# Patient Record
Sex: FEMALE | Race: WHITE | NOT HISPANIC OR LATINO
[De-identification: names, ages, dates, MRNs, and addresses within clinical notes are randomized per-mention and may not be internally consistent; named-entity substitution may affect disease eponyms.]

---

## 2019-08-13 ENCOUNTER — APPOINTMENT (OUTPATIENT)
Dept: ORTHOPEDIC SURGERY | Facility: CLINIC | Age: 24
End: 2019-08-13
Payer: MEDICAID

## 2019-08-13 VITALS
BODY MASS INDEX: 22.91 KG/M2 | HEART RATE: 49 BPM | DIASTOLIC BLOOD PRESSURE: 73 MMHG | SYSTOLIC BLOOD PRESSURE: 109 MMHG | WEIGHT: 146 LBS | HEIGHT: 67 IN

## 2019-08-13 PROCEDURE — 99204 OFFICE O/P NEW MOD 45 MIN: CPT

## 2019-08-13 NOTE — PHYSICAL EXAM
[de-identified] : Constitutional: Well-nourished, well-developed, No acute distress\par Respiratory:  Good respiratory effort, no SOB\par Lymphatic: No regional lymphadenopathy, no lymphedema\par Psychiatric: Pleasant and normal affect, alert and oriented x3\par Skin: Clean dry and intact B/L UE/LE\par Musculoskeletal: normal except where as noted in regional exam\par \par \par Left Shoulder:\par APPEARANCE: no marked deformities, no swelling or malalignment\par POSITIVE TENDERNESS: none\par NONTENDER: supraspinatus, infraspinatus, teres minor, LH biceps, anterior and posterior capsule, AC joint\par ROM: full & painless, no scapular winging or dyskinesia present\par RESISTIVE TESTING: painless 5/5 resisted flex/ext, empty can/ER/IR, horizontal abd/add \par SPECIAL TESTS: neg Drop Arm, neg Empty Can, neg Savage/Neers, neg Vargas's, neg Speeds, neg Apprehension, neg cross arm adduction, neg apley's scratch test\par Vasc: 2+ radial pulse\par Neuro: AIN, PIN, Ulnar nerve intact to motor, DTRs 2+/4 biceps, triceps, brachioradialis\par Sensation: Intact to light touch throughout\par B/L Elbows:  No asymmetry, malalignment, or swelling, Full ROM, 5/5 strength in flexion/ext, pronation/supination, Joints stable\par B/L Wrist and Hand:  No asymmetry, malalignment, or swelling, Full ROM, 5/5 strength in wrist and long finger flexion/ext, radial/ulnar deviation, Joints stable\par \par Right Shoulder:\par APPEARANCE: no marked deformities, no swelling or malalignment\par POSITIVE TENDERNESS: LH biceps tendon in the bicipital groove\par NONTENDER: supraspinatus, infraspinatus, teres minor. anterior and posterior capsule. AC joint. \par ROM: full & painless, mild scapular dyskinesia, no scapular winging \par RESISTIVE TESTING: Mild pain in anterior shoulder with resisted flexion with hand in supination, painless 5/5 resisted ext, empty can/ER/IR, horizontal abd/add \par SPECIAL TESTS: + Speed's test for pain without subluxation, + yerguson's test for pain in ant shoulder, neg Drop Arm, neg Savage/Neers. neg Vargas's. neg Apprehension. neg cross arm adduction, neg apley's scratch test\par Vasc: 2+ radial pulse\par Neuro: AIN, PIN, Ulnar nerve in tact to motor\par Sensation: Intact to light touch throughout\par \par + Hypermobility: Beighton score 9 out of 9

## 2019-08-13 NOTE — HISTORY OF PRESENT ILLNESS
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 She states she first noticed the pain when she was doing pushups. She had difficulty moving her shoulder. She saw a different orthopedic and had an MRI done, She has been in PT for a little over a month now. She states that several years ago she dislocated her shoulder in her sleep. She has not had any other injuries. Denies N/T/R. She is currently working as a supervisor.

## 2019-08-13 NOTE — DISCUSSION/SUMMARY
[de-identified] : Discussed findings of today's exam and possible causes of patient's pain.  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  Patient already had prior MRI with no abnormal findings. Reviewed possible courses of treatment, and we collaboratively decided best course of treatment at this time will include conservative management. Patient will be started on a course of physical therapy will focus on scapular stabilization and dynamic stabilization of the rotator cuff muscles.   Follow up as needed.  Patient appreciates and agrees with current plan.\par \par This note was generated using dragon medical dictation software.  A reasonable effort has been made for proofreading its contents, but typos may still remain.  If there are any questions or points of clarification needed please notify my office.

## 2020-10-24 ENCOUNTER — EMERGENCY (EMERGENCY)
Facility: HOSPITAL | Age: 25
LOS: 1 days | Discharge: HOME | End: 2020-10-24
Attending: EMERGENCY MEDICINE | Admitting: EMERGENCY MEDICINE
Payer: COMMERCIAL

## 2020-10-24 VITALS
RESPIRATION RATE: 16 BRPM | OXYGEN SATURATION: 100 % | SYSTOLIC BLOOD PRESSURE: 139 MMHG | DIASTOLIC BLOOD PRESSURE: 93 MMHG | HEART RATE: 68 BPM | TEMPERATURE: 98 F

## 2020-10-24 LAB
ALBUMIN SERPL ELPH-MCNC: 4.2 G/DL — SIGNIFICANT CHANGE UP (ref 3.3–5)
ALP SERPL-CCNC: 43 U/L — SIGNIFICANT CHANGE UP (ref 40–120)
ALT FLD-CCNC: 21 U/L — SIGNIFICANT CHANGE UP (ref 4–33)
ANION GAP SERPL CALC-SCNC: 10 MMO/L — SIGNIFICANT CHANGE UP (ref 7–14)
APPEARANCE UR: SIGNIFICANT CHANGE UP
AST SERPL-CCNC: 28 U/L — SIGNIFICANT CHANGE UP (ref 4–32)
BACTERIA # UR AUTO: NEGATIVE — SIGNIFICANT CHANGE UP
BASOPHILS # BLD AUTO: 0.04 K/UL — SIGNIFICANT CHANGE UP (ref 0–0.2)
BASOPHILS NFR BLD AUTO: 0.7 % — SIGNIFICANT CHANGE UP (ref 0–2)
BASOPHILS NFR SPEC: 1.7 % — SIGNIFICANT CHANGE UP (ref 0–2)
BILIRUB SERPL-MCNC: < 0.2 MG/DL — LOW (ref 0.2–1.2)
BILIRUB UR-MCNC: NEGATIVE — SIGNIFICANT CHANGE UP
BLOOD UR QL VISUAL: HIGH
BUN SERPL-MCNC: 10 MG/DL — SIGNIFICANT CHANGE UP (ref 7–23)
CALCIUM SERPL-MCNC: 9.2 MG/DL — SIGNIFICANT CHANGE UP (ref 8.4–10.5)
CHLORIDE SERPL-SCNC: 104 MMOL/L — SIGNIFICANT CHANGE UP (ref 98–107)
CO2 SERPL-SCNC: 24 MMOL/L — SIGNIFICANT CHANGE UP (ref 22–31)
COLOR SPEC: SIGNIFICANT CHANGE UP
CREAT SERPL-MCNC: 0.79 MG/DL — SIGNIFICANT CHANGE UP (ref 0.5–1.3)
EOSINOPHIL # BLD AUTO: 0.12 K/UL — SIGNIFICANT CHANGE UP (ref 0–0.5)
EOSINOPHIL NFR BLD AUTO: 2.1 % — SIGNIFICANT CHANGE UP (ref 0–6)
EOSINOPHIL NFR FLD: 6.1 % — HIGH (ref 0–6)
GIANT PLATELETS BLD QL SMEAR: PRESENT — SIGNIFICANT CHANGE UP
GLUCOSE SERPL-MCNC: 96 MG/DL — SIGNIFICANT CHANGE UP (ref 70–99)
GLUCOSE UR-MCNC: NEGATIVE — SIGNIFICANT CHANGE UP
HCT VFR BLD CALC: 28.9 % — LOW (ref 34.5–45)
HGB BLD-MCNC: 9 G/DL — LOW (ref 11.5–15.5)
HYALINE CASTS # UR AUTO: NEGATIVE — SIGNIFICANT CHANGE UP
IMM GRANULOCYTES NFR BLD AUTO: 2.1 % — HIGH (ref 0–1.5)
KETONES UR-MCNC: NEGATIVE — SIGNIFICANT CHANGE UP
LEUKOCYTE ESTERASE UR-ACNC: SIGNIFICANT CHANGE UP
LYMPHOCYTES # BLD AUTO: 1.79 K/UL — SIGNIFICANT CHANGE UP (ref 1–3.3)
LYMPHOCYTES # BLD AUTO: 30.7 % — SIGNIFICANT CHANGE UP (ref 13–44)
LYMPHOCYTES NFR SPEC AUTO: 17.4 % — SIGNIFICANT CHANGE UP (ref 13–44)
MCHC RBC-ENTMCNC: 28.8 PG — SIGNIFICANT CHANGE UP (ref 27–34)
MCHC RBC-ENTMCNC: 31.1 % — LOW (ref 32–36)
MCV RBC AUTO: 92.3 FL — SIGNIFICANT CHANGE UP (ref 80–100)
MONOCYTES # BLD AUTO: 0.38 K/UL — SIGNIFICANT CHANGE UP (ref 0–0.9)
MONOCYTES NFR BLD AUTO: 6.5 % — SIGNIFICANT CHANGE UP (ref 2–14)
MONOCYTES NFR BLD: 12.2 % — HIGH (ref 2–9)
NEUTROPHIL AB SER-ACNC: 60 % — SIGNIFICANT CHANGE UP (ref 43–77)
NEUTROPHILS # BLD AUTO: 3.39 K/UL — SIGNIFICANT CHANGE UP (ref 1.8–7.4)
NEUTROPHILS NFR BLD AUTO: 57.9 % — SIGNIFICANT CHANGE UP (ref 43–77)
NITRITE UR-MCNC: NEGATIVE — SIGNIFICANT CHANGE UP
NRBC # FLD: 0 K/UL — SIGNIFICANT CHANGE UP (ref 0–0)
PH UR: 6.5 — SIGNIFICANT CHANGE UP (ref 5–8)
PLATELET # BLD AUTO: 298 K/UL — SIGNIFICANT CHANGE UP (ref 150–400)
PLATELET COUNT - ESTIMATE: NORMAL — SIGNIFICANT CHANGE UP
PMV BLD: 10.2 FL — SIGNIFICANT CHANGE UP (ref 7–13)
POTASSIUM SERPL-MCNC: 4.1 MMOL/L — SIGNIFICANT CHANGE UP (ref 3.5–5.3)
POTASSIUM SERPL-SCNC: 4.1 MMOL/L — SIGNIFICANT CHANGE UP (ref 3.5–5.3)
PROT SERPL-MCNC: 7 G/DL — SIGNIFICANT CHANGE UP (ref 6–8.3)
PROT UR-MCNC: NEGATIVE — SIGNIFICANT CHANGE UP
RBC # BLD: 3.13 M/UL — LOW (ref 3.8–5.2)
RBC # FLD: 12.8 % — SIGNIFICANT CHANGE UP (ref 10.3–14.5)
RBC CASTS # UR COMP ASSIST: SIGNIFICANT CHANGE UP (ref 0–?)
SODIUM SERPL-SCNC: 138 MMOL/L — SIGNIFICANT CHANGE UP (ref 135–145)
SP GR SPEC: 1.01 — SIGNIFICANT CHANGE UP (ref 1–1.04)
SQUAMOUS # UR AUTO: SIGNIFICANT CHANGE UP
UROBILINOGEN FLD QL: NORMAL — SIGNIFICANT CHANGE UP
VARIANT LYMPHS # BLD: 2.6 % — SIGNIFICANT CHANGE UP
WBC # BLD: 5.84 K/UL — SIGNIFICANT CHANGE UP (ref 3.8–10.5)
WBC # FLD AUTO: 5.84 K/UL — SIGNIFICANT CHANGE UP (ref 3.8–10.5)
WBC UR QL: HIGH (ref 0–?)

## 2020-10-24 PROCEDURE — 99284 EMERGENCY DEPT VISIT MOD MDM: CPT

## 2020-10-24 PROCEDURE — 73130 X-RAY EXAM OF HAND: CPT | Mod: 26,LT

## 2020-10-24 NOTE — ED PROVIDER NOTE - PATIENT PORTAL LINK FT
You can access the FollowMyHealth Patient Portal offered by [facility] by registering at the following website: [email or web address]. By joining [employer]’s FollowMyHealth portal, you will also be able to view your health information using other applications (apps) compatible with our system.

## 2020-10-24 NOTE — ED ADULT TRIAGE NOTE - CHIEF COMPLAINT QUOTE
pt was seen at [facility] last week , pt had heavy bleeding after sex and passed out hitting head and injured hand/  pt c/o today of headache and left hand  having some numbness and pain/

## 2020-10-24 NOTE — ED ADULT NURSE NOTE - CHIEF COMPLAINT QUOTE
pt was seen at [facility] last week , pt had heavy bleeding after sex and passed out hitting head and injured hand/  pt c/o today of headache and left hand  having some numbness and pain/

## 2020-10-24 NOTE — ED PROVIDER NOTE - NSFOLLOWUPINSTRUCTIONS_ED_ALL_ED_FT
A concussion is a brain injury from a direct hit (blow) to your head or body. This blow causes your brain to shake quickly back and forth inside your skull. This can damage brain cells and cause chemical changes in your brain. Concussions are usually not life-threatening but can cause several serious symptoms.  Post-concussion syndrome is when symptoms that occur after a concussion last longer than normal. These symptoms can last from weeks to months.    Treatment for this condition may depend on your symptoms. Symptoms usually go away on their own over time. Treatments may include: Medicines for headaches. Follow up with primary care for further evaluation    Your test results from your ED visit were discussed with you prior to discharge  You were provided with a copy of your test results

## 2020-10-24 NOTE — ED ADULT NURSE NOTE - OBJECTIVE STATEMENT
headache and numbness on lt hand. denies pain at present. siad her K was low when she was treated on [facility] [place] last Sun. labs swnt and IV placed. ECG ordered

## 2020-10-24 NOTE — ED ADULT NURSE NOTE - NSIMPLEMENTINTERV_GEN_ALL_ED
Implemented All Fall Risk Interventions:  [place] to call system. Call bell, personal items and telephone within reach. Instruct patient to call for assistance. Room bathroom lighting operational. Non-slip footwear when patient is off stretcher. Physically safe environment: no spills, clutter or unnecessary equipment. Stretcher in lowest position, wheels locked, appropriate side rails in place. Provide visual cue, wrist band, yellow gown, etc. Monitor gait and stability. Monitor for mental status changes and reorient to person, place, and time. Review medications for side effects contributing to fall risk. Reinforce activity limits and safety measures with patient and family.

## 2020-10-24 NOTE — ED PROVIDER NOTE - NS ED ROS FT
Constitutional: (-) fever   Head: Normal cephalic, Atraumatic  Eyes/ENT: (-) vision changes  Cardiovascular: (-) chest pain, (-) wheezing  Respiratory: (-) cough, (-) shortness of breath  Gastrointestinal: (-) vomiting, (-) diarrhea, (-) abdominal pain  : (-) dysuria   Musculoskeletal: (-) back pain (+) neck pain  Integumentary: (-) rash, (-) edema  Neurological: (-)loc (+) headache  Allergic/Immunologic: (-) pruritus

## 2020-10-24 NOTE — ED PROVIDER NOTE - OBJECTIVE STATEMENT
24 Y/O F w/ no PMH presents for headache and LT hand pain. Last week experienced profuse vaginal bleeding and headache during intercourse. After showering sat on toilet and fainted. Admits to hitting Lt temple on bathroom counter. Contacted friend for assistance to emergency room. On her way to car outside her home she fainted again. Evaluated at [facility]. Noted to have low potassium and given multiple bags of fluid as per patient. Since that time has experienced bitemporal and posterior headache radiating to neck. Admits to dizziness but not room spinning. Feels off balance. No relief with Tylenol but this morning took 2 Advil with relief. Experiencing numbness/tingling to LT ring finger and pain with ROM. Denies visual change, nausea/vomiting, chest pain, palpitations or SOB. Currently on menstrual period.

## 2020-10-24 NOTE — ED PROVIDER NOTE - CARE PLAN
Principal Discharge DX:	Headache   Principal Discharge DX:	Headache  Secondary Diagnosis:	Hand pain, left

## 2020-10-24 NOTE — ED PROVIDER NOTE - PHYSICAL EXAMINATION
Vital signs reviewed.   CONSTITUTIONAL: Well-appearing; well-nourished; in no apparent distress. Non-toxic appearing.   HEAD: Normocephalic, atraumatic.  EYES: PERRL, EOM intact, conjunctiva and no sclera injection noted  ENT: normal nose; no rhinorrhea  NECK: FAROM against gravity and with resistance. No C-spine tenderness  CARD: Normal S1, S2  RESP: Normal chest excursion with respiration; breath sounds clear and equal bilaterally  ABD/GI: soft, non-distended; non-tender; no palpable organomegaly, no pulsatile mass.  EXT/MS: LT hand no scaphoid tenderness, normal  strength. Negative Tinel and Phalen. Moves all extremities; distal pulses are normal, no pedal edema.  SKIN: Normal for age and race; warm; dry; good turgor; no apparent lesions or exudate noted.  NEURO: Awake, alert, oriented x 3, no gross deficits, CN II-XII grossly intact, no motor or sensory deficit noted.  PSYCH: Normal mood; appropriate affect.

## 2020-10-24 NOTE — ED PROVIDER NOTE - ATTENDING CONTRIBUTION TO CARE
DR. COLLINS, ATTENDING MD-  I performed a face to face bedside interview with the patient regarding history of present illness, review of symptoms and past medical history. I completed an independent physical exam.  I have discussed the patient's plan of care with the PA.   Documentation as above in the note.    26 y/o female with ha, left hand pain.  She was having sexual intercourse last week, developed vag bld.  She went to shower and then sat on toilet.  Had syncopal episode at that time striking head on sink.  Went to osh, told her K was low so it was repleted.  Today with continued mild ha, mental fogginess, worsening left hand pain.  Denies f/c, neck pain, cp, sob, cough, abd pain, n/v/d, dysuria, rash.  Afebrile, vs wnl, nad, ncat, non ttp spinous midline, full rom neck, left hand mild ttp over base of 4th mcp, ctabil, s1s2 rrr no m/r/g, abd soft non ttp no r/g, no cva tenderness b/l, no leg swelling b/l, no rash.  Likely concussed, evaluate for lyte abn, pregnancy, hand fx.  Obtain cbc cmp ucg ekg xr hand reassess, antic dc with neuro f/u as o/p.

## 2020-10-24 NOTE — ED PROVIDER NOTE - CLINICAL SUMMARY MEDICAL DECISION MAKING FREE TEXT BOX
24 Y/O F w/ no PMH presents for headache and LT hand pain. XR to r/o hand fracture. CMP r/o electrolyte abnormality. Pain management, re-assessment and Neuro follow up

## 2020-11-29 ENCOUNTER — EMERGENCY (EMERGENCY)
Facility: HOSPITAL | Age: 25
LOS: 1 days | Discharge: HOME | End: 2020-11-29
Admitting: STUDENT IN AN ORGANIZED HEALTH CARE EDUCATION/TRAINING PROGRAM
Payer: COMMERCIAL

## 2020-11-29 VITALS
DIASTOLIC BLOOD PRESSURE: 95 MMHG | TEMPERATURE: 97 F | OXYGEN SATURATION: 100 % | RESPIRATION RATE: 16 BRPM | SYSTOLIC BLOOD PRESSURE: 134 MMHG | HEART RATE: 76 BPM

## 2020-11-29 PROBLEM — M75.22 BICEPS TENDINITIS OF LEFT UPPER EXTREMITY: Status: RESOLVED | Noted: 2019-08-13 | Resolved: 2020-11-29

## 2020-11-29 PROBLEM — Z78.9 OTHER SPECIFIED HEALTH STATUS: Chronic | Status: ACTIVE | Noted: 2020-10-24

## 2020-11-29 PROBLEM — G25.89 SCAPULAR DYSKINESIS: Status: RESOLVED | Noted: 2019-08-13 | Resolved: 2020-11-29

## 2020-11-29 LAB
B PERT DNA SPEC QL NAA+PROBE: SIGNIFICANT CHANGE UP
C PNEUM DNA SPEC QL NAA+PROBE: SIGNIFICANT CHANGE UP
FLUAV H1 2009 PAND RNA SPEC QL NAA+PROBE: SIGNIFICANT CHANGE UP
FLUAV H1 RNA SPEC QL NAA+PROBE: SIGNIFICANT CHANGE UP
FLUAV H3 RNA SPEC QL NAA+PROBE: SIGNIFICANT CHANGE UP
FLUAV SUBTYP SPEC NAA+PROBE: SIGNIFICANT CHANGE UP
FLUBV RNA SPEC QL NAA+PROBE: SIGNIFICANT CHANGE UP
HADV DNA SPEC QL NAA+PROBE: SIGNIFICANT CHANGE UP
HCOV PNL SPEC NAA+PROBE: SIGNIFICANT CHANGE UP
HMPV RNA SPEC QL NAA+PROBE: SIGNIFICANT CHANGE UP
HPIV1 RNA SPEC QL NAA+PROBE: SIGNIFICANT CHANGE UP
HPIV2 RNA SPEC QL NAA+PROBE: SIGNIFICANT CHANGE UP
HPIV3 RNA SPEC QL NAA+PROBE: SIGNIFICANT CHANGE UP
HPIV4 RNA SPEC QL NAA+PROBE: SIGNIFICANT CHANGE UP
RAPID RVP RESULT: SIGNIFICANT CHANGE UP
RSV RNA SPEC QL NAA+PROBE: SIGNIFICANT CHANGE UP
RV+EV RNA SPEC QL NAA+PROBE: SIGNIFICANT CHANGE UP
SARS-COV-2 RNA SPEC QL NAA+PROBE: SIGNIFICANT CHANGE UP

## 2020-11-29 PROCEDURE — 99283 EMERGENCY DEPT VISIT LOW MDM: CPT

## 2020-11-29 RX ORDER — IBUPROFEN 200 MG
600 TABLET ORAL ONCE
Refills: 0 | Status: COMPLETED | OUTPATIENT
Start: 2020-11-29 | End: 2020-11-29

## 2020-11-29 RX ADMIN — Medication 600 MILLIGRAM(S): at 12:53

## 2020-11-29 NOTE — ED PROVIDER NOTE - PATIENT PORTAL LINK FT
You can access the FollowMyHealth Patient Portal offered by [facility] by registering at the following website: [email or web address]. By joining [employer]’s FollowMyHealth portal, you will also be able to view your health information using other applications (apps) compatible with our system.

## 2020-11-29 NOTE — ED ADULT TRIAGE NOTE - CHIEF COMPLAINT QUOTE
pt states finding lump behind R ear 2 days ago, discomfort present when moving head. pt denies fever. no PMH

## 2020-11-29 NOTE — ED PROVIDER NOTE - CLINICAL SUMMARY MEDICAL DECISION MAKING FREE TEXT BOX
26 yo F with no sig PMHX, no social history, here with complaint of painful, small lump below and behind right ear x 2 days, was initially not painful but now it is. reports sore throat, and mild ear pain.   clinical exam likely consistent with viral pharyngitis with tonsillar lymphadenopathy.  will swab for strep however until.   will swab for rvp, covid.  advise motrin and return precautions.

## 2020-11-29 NOTE — ED PROVIDER NOTE - OBJECTIVE STATEMENT
24 yo F with no sig PMHX, no social history, here with complaint of painful, small lump below and behind right ear x 2 days, was initially not painful but now it is. reports sore throat, and mild ear pain. Denies headache, posterior or anterior neck pain, fever, nausea, vomiting, cp ,sob, cough, difficultly breathing or swallowing.

## 2020-11-29 NOTE — ED PROVIDER NOTE - NSFOLLOWUPINSTRUCTIONS_ED_ALL_ED_FT
Please Quarantine until results of RVP/ COVID-19 are back and negative.   If positive, please quarantine for 14 days.     Follow up with your Primary Medical Doctor within 2-3days.      Follow with ENT within the week. If you have issues obtaining follow up, please call: [phone] to obtain a doctor or specialist who takes your insurance in your area.     If results or reports were given to you, show copies of your reports given to you. Take all of your medications as previously prescribed.    Take Motrin 600mg every 8hrs with food for pain, swelling.     If you develop fever, difficulty breathing swallowing, worsening swelling, redness.

## 2020-12-01 ENCOUNTER — APPOINTMENT (OUTPATIENT)
Dept: OBGYN | Facility: CLINIC | Age: 25
End: 2020-12-01
Payer: COMMERCIAL

## 2020-12-01 VITALS
SYSTOLIC BLOOD PRESSURE: 120 MMHG | OXYGEN SATURATION: 99 % | DIASTOLIC BLOOD PRESSURE: 80 MMHG | WEIGHT: 146 LBS | HEIGHT: 67 IN | TEMPERATURE: 97 F | BODY MASS INDEX: 22.91 KG/M2 | HEART RATE: 98 BPM

## 2020-12-01 DIAGNOSIS — G25.89 OTHER SPECIFIED EXTRAPYRAMIDAL AND MOVEMENT DISORDERS: ICD-10-CM

## 2020-12-01 DIAGNOSIS — Z78.9 OTHER SPECIFIED HEALTH STATUS: ICD-10-CM

## 2020-12-01 DIAGNOSIS — M75.22 BICIPITAL TENDINITIS, LEFT SHOULDER: ICD-10-CM

## 2020-12-01 LAB
CULTURE RESULTS: SIGNIFICANT CHANGE UP
SPECIMEN SOURCE: SIGNIFICANT CHANGE UP

## 2020-12-01 PROCEDURE — 99072 ADDL SUPL MATRL&STAF TM PHE: CPT

## 2020-12-01 PROCEDURE — 99385 PREV VISIT NEW AGE 18-39: CPT

## 2020-12-01 NOTE — COUNSELING
[Nutrition/ Exercise/ Weight Management] : nutrition, exercise, weight management [Vitamins/Supplements] : vitamins/supplements [Breast Self Exam] : breast self exam [Contraception/ Emergency Contraception/ Safe Sexual Practices] : contraception, emergency contraception, safe sexual practices [Drugs/Alcohol] : drugs, alcohol [Sexual Abuse] : sexual abuse [STD (testing, results, tx)] : STD (testing, results, tx)

## 2020-12-01 NOTE — HISTORY OF PRESENT ILLNESS
[TextBox_4] : 2 mos ago PCB --drunk,rough sex and larger--1st time with this partner --not coerced.Bleeding signif went to [facility].Inconsistent use condoms ,not interested in OCP/IUD/aor any hormonal contr [Yes] : Patient has concerns regarding sex [Men] : men [Condoms] : Condoms

## 2020-12-02 ENCOUNTER — APPOINTMENT (OUTPATIENT)
Dept: OBGYN | Facility: CLINIC | Age: 25
End: 2020-12-02

## 2020-12-02 LAB
C TRACH RRNA SPEC QL NAA+PROBE: NOT DETECTED
N GONORRHOEA RRNA SPEC QL NAA+PROBE: NOT DETECTED
SOURCE TP AMPLIFICATION: NORMAL

## 2020-12-03 LAB — CYTOLOGY CVX/VAG DOC THIN PREP: ABNORMAL

## 2020-12-07 ENCOUNTER — TRANSCRIPTION ENCOUNTER (OUTPATIENT)
Age: 25
End: 2020-12-07

## 2020-12-08 ENCOUNTER — NON-APPOINTMENT (OUTPATIENT)
Age: 25
End: 2020-12-08

## 2020-12-08 DIAGNOSIS — B96.89 ACUTE VAGINITIS: ICD-10-CM

## 2020-12-08 DIAGNOSIS — N76.0 ACUTE VAGINITIS: ICD-10-CM

## 2020-12-09 ENCOUNTER — LABORATORY RESULT (OUTPATIENT)
Age: 25
End: 2020-12-09

## 2020-12-09 ENCOUNTER — APPOINTMENT (OUTPATIENT)
Dept: FAMILY MEDICINE | Facility: CLINIC | Age: 25
End: 2020-12-09
Payer: COMMERCIAL

## 2020-12-09 VITALS
HEIGHT: 67 IN | SYSTOLIC BLOOD PRESSURE: 110 MMHG | TEMPERATURE: 97.6 F | HEART RATE: 72 BPM | RESPIRATION RATE: 14 BRPM | BODY MASS INDEX: 23.54 KG/M2 | OXYGEN SATURATION: 99 % | WEIGHT: 150 LBS | DIASTOLIC BLOOD PRESSURE: 68 MMHG

## 2020-12-09 DIAGNOSIS — Z80.49 FAMILY HISTORY OF MALIGNANT NEOPLASM OF OTHER GENITAL ORGANS: ICD-10-CM

## 2020-12-09 DIAGNOSIS — Z23 ENCOUNTER FOR IMMUNIZATION: ICD-10-CM

## 2020-12-09 PROCEDURE — 99204 OFFICE O/P NEW MOD 45 MIN: CPT | Mod: 25

## 2020-12-09 PROCEDURE — 36415 COLL VENOUS BLD VENIPUNCTURE: CPT

## 2020-12-09 PROCEDURE — 99072 ADDL SUPL MATRL&STAF TM PHE: CPT

## 2020-12-13 LAB
25(OH)D3 SERPL-MCNC: 32.3 NG/ML
ALBUMIN SERPL ELPH-MCNC: 4.4 G/DL
ALP BLD-CCNC: 95 U/L
ALT SERPL-CCNC: 81 U/L
ANION GAP SERPL CALC-SCNC: 11 MMOL/L
APPEARANCE: CLEAR
AST SERPL-CCNC: 60 U/L
B BURGDOR IGG+IGM SER QL IB: NORMAL
BASOPHILS # BLD AUTO: 0.05 K/UL
BASOPHILS NFR BLD AUTO: 0.7 %
BILIRUB SERPL-MCNC: 0.2 MG/DL
BILIRUBIN URINE: NEGATIVE
BLOOD URINE: NEGATIVE
BUN SERPL-MCNC: 10 MG/DL
CALCIUM SERPL-MCNC: 9.6 MG/DL
CHLORIDE SERPL-SCNC: 101 MMOL/L
CHOLEST SERPL-MCNC: 154 MG/DL
CO2 SERPL-SCNC: 24 MMOL/L
COLOR: NORMAL
CREAT SERPL-MCNC: 0.84 MG/DL
EOSINOPHIL # BLD AUTO: 0.05 K/UL
EOSINOPHIL NFR BLD AUTO: 0.7 %
FOLATE SERPL-MCNC: 11.2 NG/ML
GLUCOSE QUALITATIVE U: NEGATIVE
GLUCOSE SERPL-MCNC: 97 MG/DL
HAV IGM SER QL: NONREACTIVE
HBV CORE IGM SER QL: NONREACTIVE
HBV SURFACE AG SER QL: NONREACTIVE
HCT VFR BLD CALC: 36.2 %
HCV AB SER QL: NONREACTIVE
HCV S/CO RATIO: 0.13 S/CO
HDLC SERPL-MCNC: 53 MG/DL
HGB BLD-MCNC: 11.3 G/DL
HIV1+2 AB SPEC QL IA.RAPID: NONREACTIVE
IMM GRANULOCYTES NFR BLD AUTO: 0.1 %
KETONES URINE: NEGATIVE
LDH SERPL-CCNC: 251 U/L
LDLC SERPL CALC-MCNC: 79 MG/DL
LEUKOCYTE ESTERASE URINE: NEGATIVE
LYMPHOCYTES # BLD AUTO: 4.09 K/UL
LYMPHOCYTES NFR BLD AUTO: 54.5 %
M TB IFN-G BLD-IMP: NEGATIVE
MAN DIFF?: NORMAL
MCHC RBC-ENTMCNC: 26.5 PG
MCHC RBC-ENTMCNC: 31.2 GM/DL
MCV RBC AUTO: 84.8 FL
MONOCYTES # BLD AUTO: 0.47 K/UL
MONOCYTES NFR BLD AUTO: 6.3 %
NEUTROPHILS # BLD AUTO: 2.84 K/UL
NEUTROPHILS NFR BLD AUTO: 37.7 %
NITRITE URINE: NEGATIVE
NONHDLC SERPL-MCNC: 101 MG/DL
PH URINE: 7
PLATELET # BLD AUTO: 372 K/UL
POTASSIUM SERPL-SCNC: 4.4 MMOL/L
PROT SERPL-MCNC: 7.7 G/DL
PROTEIN URINE: NEGATIVE
QUANTIFERON TB PLUS MITOGEN MINUS NIL: 9.97 IU/ML
QUANTIFERON TB PLUS NIL: 0.04 IU/ML
QUANTIFERON TB PLUS TB1 MINUS NIL: 0 IU/ML
QUANTIFERON TB PLUS TB2 MINUS NIL: 0 IU/ML
RBC # BLD: 4.27 M/UL
RBC # FLD: 13.2 %
SODIUM SERPL-SCNC: 136 MMOL/L
SPECIFIC GRAVITY URINE: 1.01
TRIGL SERPL-MCNC: 109 MG/DL
TSH SERPL-ACNC: 0.97 UIU/ML
URATE SERPL-MCNC: 3.6 MG/DL
UROBILINOGEN URINE: NORMAL
VIT B12 SERPL-MCNC: 633 PG/ML
WBC # FLD AUTO: 7.51 K/UL

## 2020-12-13 NOTE — HISTORY OF PRESENT ILLNESS
[FreeTextEntry8] : cc; new ,tired , enlarged cervical nodes \par Patient came to establish herself as a new pt, she c/o feeling tired, no fever,no chills, no weight loss., + night sweats - few months, Also she notices enlarge glands under her right jaw - now getting smaller,No SOB,no cough,no abd pain \par \par

## 2020-12-13 NOTE — REVIEW OF SYSTEMS
[Fever] : no fever [Chills] : no chills [Fatigue] : fatigue [Night Sweats] : night sweats [Swollen Glands] : swollen glands [Negative] : Psychiatric [de-identified] : right cervical

## 2020-12-13 NOTE — HEALTH RISK ASSESSMENT
[Yes] : Yes [2 - 4 times a month (2 pts)] : 2-4 times a month (2 points) [1 or 2 (0 pts)] : 1 or 2 (0 points) [Never (0 pts)] : Never (0 points) [] : No [No falls in past year] : Patient reported no falls in the past year [0] : 2) Feeling down, depressed, or hopeless: Not at all (0) [Audit-CScore] : 1 [de-identified] : walking [de-identified] : regular  [[id number]] : 0

## 2020-12-13 NOTE — PHYSICAL EXAM
[No JVD] : no jugular venous distention [Supple] : supple [No Varicosities] : no varicosities [No Edema] : there was no peripheral edema [Normal] : no rash [de-identified] : + right submandibular lymph adenopathy , 1 cm,non tender,soft

## 2020-12-23 ENCOUNTER — APPOINTMENT (OUTPATIENT)
Dept: FAMILY MEDICINE | Facility: CLINIC | Age: 25
End: 2020-12-23
Payer: COMMERCIAL

## 2020-12-23 VITALS
DIASTOLIC BLOOD PRESSURE: 76 MMHG | SYSTOLIC BLOOD PRESSURE: 110 MMHG | BODY MASS INDEX: 23.86 KG/M2 | HEIGHT: 67 IN | WEIGHT: 152 LBS | OXYGEN SATURATION: 99 % | RESPIRATION RATE: 14 BRPM | HEART RATE: 64 BPM | TEMPERATURE: 97.6 F

## 2020-12-23 PROCEDURE — 99395 PREV VISIT EST AGE 18-39: CPT | Mod: 25

## 2020-12-23 PROCEDURE — 36415 COLL VENOUS BLD VENIPUNCTURE: CPT

## 2020-12-23 PROCEDURE — 99072 ADDL SUPL MATRL&STAF TM PHE: CPT

## 2020-12-23 RX ORDER — METRONIDAZOLE 7.5 MG/G
0.75 GEL VAGINAL
Qty: 1 | Refills: 3 | Status: COMPLETED | COMMUNITY
Start: 2020-12-08 | End: 2020-12-23

## 2020-12-23 NOTE — HISTORY OF PRESENT ILLNESS
[FreeTextEntry1] : cc: physical  [de-identified] : Patient came  for physical. Denies CP,SOB,Abd pain, no N,V,C,D, pt still c/o night sweats, fatigue  no fever, no chills..Patient last blood work shows Anemia,  mildly  elevated LDH , she got US  of the  neck done today \par

## 2020-12-23 NOTE — HEALTH RISK ASSESSMENT
[Good] : ~his/her~  mood as  good [Yes] : Yes [2 - 3 times a week (3 pts)] : 2 - 3  times a week (3 points) [1 or 2 (0 pts)] : 1 or 2 (0 points) [Never (0 pts)] : Never (0 points) [No] : In the past 12 months have you used drugs other than those required for medical reasons? No [No falls in past year] : Patient reported no falls in the past year [0] : 2) Feeling down, depressed, or hopeless: Not at all (0) [Patient reported PAP Smear was normal] : Patient reported PAP Smear was normal [None] : None [With Family] : lives with family [Unemployed] : unemployed [College] : College [Single] : single [Feels Safe at Home] : Feels safe at home [Smoke Detector] : smoke detector [Carbon Monoxide Detector] : carbon monoxide detector [Seat Belt] :  uses seat belt [Sunscreen] : uses sunscreen [With Patient/Caregiver] : With Patient/Caregiver [Aggressive treatment] : aggressive treatment [FreeTextEntry1] : night sweats, swollen neck  [] : No [de-identified] : No [de-identified] : No [Audit-CScore] : 3 [de-identified] : none [de-identified] : regular  [AOE8Drole] : 0 [Change in mental status noted] : No change in mental status noted [Language] : denies difficulty with language [Reports changes in hearing] : Reports no changes in hearing [Reports changes in vision] : Reports no changes in vision [Reports changes in dental health] : Reports no changes in dental health [MammogramDate] : NA [PapSmearDate] : 12/20 [BoneDensityDate] : NA [ColonoscopyDate] : NA [HIVDate] : 11/20 [HepatitisCDate] : 11/20 [AdvancecareDate] : 12/20

## 2020-12-23 NOTE — PHYSICAL EXAM
[No Varicosities] : no varicosities [No Edema] : there was no peripheral edema [Declined Breast Exam] : declined breast exam  [Normal Supraclavicular Nodes] : no supraclavicular lymphadenopathy [Normal Axillary Nodes] : no axillary lymphadenopathy [Alert and Oriented x3] : oriented to person, place, and time [Normal] : affect was normal and insight and judgment were intact [de-identified] : done by GYN  [de-identified] : + enlarged right submandibular lymph nodes

## 2020-12-23 NOTE — REVIEW OF SYSTEMS
[Night Sweats] : night sweats [Negative] : Psychiatric [Fever] : no fever [Chills] : no chills [Fatigue] : no fatigue [Hot Flashes] : no hot flashes [Earache] : no earache [Hearing Loss] : no hearing loss [Nosebleed] : no nosebleeds [Nasal Discharge] : no nasal discharge [Sore Throat] : no sore throat [Postnasal Drip] : no postnasal drip [Easy Bleeding] : no easy bleeding [Easy Bruising] : no easy bruising [Swollen Glands] : swollen glands

## 2020-12-30 LAB
ALBUMIN SERPL ELPH-MCNC: 4.4 G/DL
ALP BLD-CCNC: 63 U/L
ALT SERPL-CCNC: 16 U/L
ANA SER IF-ACNC: NEGATIVE
ANION GAP SERPL CALC-SCNC: 10 MMOL/L
AST SERPL-CCNC: 20 U/L
BILIRUB SERPL-MCNC: 0.3 MG/DL
BUN SERPL-MCNC: 9 MG/DL
CALCIUM SERPL-MCNC: 9.5 MG/DL
CHLORIDE SERPL-SCNC: 102 MMOL/L
CO2 SERPL-SCNC: 25 MMOL/L
CREAT SERPL-MCNC: 1.06 MG/DL
CRP SERPL-MCNC: <0.1 MG/DL
ERYTHROCYTE [SEDIMENTATION RATE] IN BLOOD BY WESTERGREN METHOD: 13 MM/HR
FERRITIN SERPL-MCNC: 10 NG/ML
GGT SERPL-CCNC: 33 U/L
GLUCOSE SERPL-MCNC: 96 MG/DL
HETEROPH AB SER QL: POSITIVE
HGB A MFR BLD: 97.7 %
HGB A2 MFR BLD: 2.3 %
HGB FRACT BLD-IMP: NORMAL
IRON SATN MFR SERPL: 9 %
IRON SERPL-MCNC: 41 UG/DL
LDH SERPL-CCNC: 169 U/L
POTASSIUM SERPL-SCNC: 4.3 MMOL/L
PROT SERPL-MCNC: 7.3 G/DL
RBC # BLD: 4.39 M/UL
RETICS # AUTO: 1.6 %
RETICS AGGREG/RBC NFR: 70.7 K/UL
SODIUM SERPL-SCNC: 136 MMOL/L
TIBC SERPL-MCNC: 479 UG/DL
TRANSFERRIN SERPL-MCNC: 376 MG/DL
UIBC SERPL-MCNC: 438 UG/DL

## 2021-01-03 ENCOUNTER — TRANSCRIPTION ENCOUNTER (OUTPATIENT)
Age: 26
End: 2021-01-03

## 2022-01-06 ENCOUNTER — NON-APPOINTMENT (OUTPATIENT)
Age: 27
End: 2022-01-06

## 2022-01-06 ENCOUNTER — APPOINTMENT (OUTPATIENT)
Dept: FAMILY MEDICINE | Facility: CLINIC | Age: 27
End: 2022-01-06
Payer: COMMERCIAL

## 2022-01-06 VITALS
DIASTOLIC BLOOD PRESSURE: 70 MMHG | TEMPERATURE: 97.3 F | WEIGHT: 147 LBS | RESPIRATION RATE: 15 BRPM | HEIGHT: 67 IN | HEART RATE: 62 BPM | BODY MASS INDEX: 23.07 KG/M2 | SYSTOLIC BLOOD PRESSURE: 106 MMHG | OXYGEN SATURATION: 98 %

## 2022-01-06 DIAGNOSIS — U07.1 COVID-19: ICD-10-CM

## 2022-01-06 PROCEDURE — 99395 PREV VISIT EST AGE 18-39: CPT | Mod: 25

## 2022-01-06 PROCEDURE — 93000 ELECTROCARDIOGRAM COMPLETE: CPT

## 2022-01-06 PROCEDURE — 99213 OFFICE O/P EST LOW 20 MIN: CPT | Mod: 25

## 2022-01-06 NOTE — PHYSICAL EXAM
[No JVD] : no jugular venous distention [Supple] : supple [No Varicosities] : no varicosities [No Edema] : there was no peripheral edema [No Lymphadenopathy] : no lymphadenopathy [Alert and Oriented x3] : oriented to person, place, and time [Normal] : affect was normal and insight and judgment were intact

## 2022-01-06 NOTE — HEALTH RISK ASSESSMENT
[Good] : ~his/her~  mood as  good [Former] : Former [No] : No [1 or 2 (0 pts)] : 1 or 2 (0 points) [Never (0 pts)] : Never (0 points) [No falls in past year] : Patient reported no falls in the past year [0] : 2) Feeling down, depressed, or hopeless: Not at all (0) [PHQ-2 Negative - No further assessment needed] : PHQ-2 Negative - No further assessment needed [Patient reported PAP Smear was normal] : Patient reported PAP Smear was normal [None] : None [With Family] : lives with family [High School] : high school [Single] : single [# Of Children ___] : has [unfilled] children [Sexually Active] : sexually active [Feels Safe at Home] : Feels safe at home [Fully functional (bathing, dressing, toileting, transferring, walking, feeding)] : Fully functional (bathing, dressing, toileting, transferring, walking, feeding) [Fully functional (using the telephone, shopping, preparing meals, housekeeping, doing laundry, using] : Fully functional and needs no help or supervision to perform IADLs (using the telephone, shopping, preparing meals, housekeeping, doing laundry, using transportation, managing medications and managing finances) [Smoke Detector] : smoke detector [Carbon Monoxide Detector] : carbon monoxide detector [Seat Belt] :  uses seat belt [Sunscreen] : uses sunscreen [With Patient/Caregiver] : , with patient/caregiver [Aggressive treatment] : aggressive treatment [FreeTextEntry1] : heart issue  [de-identified] : none [de-identified] : vaping   2 years  [YearQuit] : 2000 [de-identified] : pt used to drink a lot of alcohol  [Audit-CScore] : 1 [de-identified] : walking [de-identified] : regular  [CZO8Rcezo] : 0 [Change in mental status noted] : No change in mental status noted [Language] : denies difficulty with language [Reports changes in hearing] : Reports no changes in hearing [Reports changes in vision] : Reports no changes in vision [MammogramDate] : NA [PapSmearDate] : 12/20 [BoneDensityDate] : NA [ColonoscopyDate] : NA [HIVDate] : 12/20 [HepatitisCDate] : 12/20 [AdvancecareDate] : 01/22

## 2022-01-06 NOTE — HISTORY OF PRESENT ILLNESS
[FreeTextEntry1] : cc; physical , heart palpitation  [de-identified] : Patient  presented for physical. Patient c/o heart beat problem, on and off, fast,then slow, + breathing problem. Pt deneis syncope, no HA,no visual changes ,no LOC, started 1 year ago, now more often. PAtient was not vacc for COVID , sicjk with COVID 2 months ago recommended to do it ASAP.P twith Hx of Anemia,not taking Fa PO, + heavy menses

## 2022-01-06 NOTE — REVIEW OF SYSTEMS
[Fatigue] : fatigue [Fever] : no fever [Chills] : no chills [Night Sweats] : no night sweats [Swollen Glands] : no swollen glands [Negative] : Heme/Lymph

## 2022-01-11 LAB
25(OH)D3 SERPL-MCNC: 40.8 NG/ML
ALBUMIN SERPL ELPH-MCNC: 4.9 G/DL
ALP BLD-CCNC: 51 U/L
ALT SERPL-CCNC: 7 U/L
ANION GAP SERPL CALC-SCNC: 18 MMOL/L
APPEARANCE: CLEAR
AST SERPL-CCNC: 15 U/L
BASOPHILS # BLD AUTO: 0.05 K/UL
BASOPHILS NFR BLD AUTO: 0.7 %
BILIRUB SERPL-MCNC: 0.4 MG/DL
BILIRUBIN URINE: NEGATIVE
BLOOD URINE: NEGATIVE
BUN SERPL-MCNC: 21 MG/DL
CALCIUM SERPL-MCNC: 9.9 MG/DL
CHLORIDE SERPL-SCNC: 102 MMOL/L
CHOLEST SERPL-MCNC: 158 MG/DL
CO2 SERPL-SCNC: 18 MMOL/L
COLOR: YELLOW
COVID-19 NUCLEOCAPSID  GAM ANTIBODY INTERPRETATION: POSITIVE
COVID-19 SPIKE DOMAIN ANTIBODY INTERPRETATION: POSITIVE
CREAT SERPL-MCNC: 1 MG/DL
EOSINOPHIL # BLD AUTO: 0.08 K/UL
EOSINOPHIL NFR BLD AUTO: 1 %
FERRITIN SERPL-MCNC: 38 NG/ML
FOLATE SERPL-MCNC: >20 NG/ML
GLUCOSE QUALITATIVE U: NEGATIVE
GLUCOSE SERPL-MCNC: 73 MG/DL
HCT VFR BLD CALC: 40.3 %
HDLC SERPL-MCNC: 70 MG/DL
HGB BLD-MCNC: 13.2 G/DL
IMM GRANULOCYTES NFR BLD AUTO: 0.5 %
IRON SATN MFR SERPL: 29 %
IRON SERPL-MCNC: 96 UG/DL
KETONES URINE: NEGATIVE
LDLC SERPL CALC-MCNC: 83 MG/DL
LEUKOCYTE ESTERASE URINE: NEGATIVE
LYMPHOCYTES # BLD AUTO: 1.9 K/UL
LYMPHOCYTES NFR BLD AUTO: 24.9 %
MAN DIFF?: NORMAL
MCHC RBC-ENTMCNC: 29.5 PG
MCHC RBC-ENTMCNC: 32.8 GM/DL
MCV RBC AUTO: 90 FL
MONOCYTES # BLD AUTO: 0.69 K/UL
MONOCYTES NFR BLD AUTO: 9 %
NEUTROPHILS # BLD AUTO: 4.88 K/UL
NEUTROPHILS NFR BLD AUTO: 63.9 %
NITRITE URINE: NEGATIVE
NONHDLC SERPL-MCNC: 89 MG/DL
PH URINE: 6.5
PLATELET # BLD AUTO: 300 K/UL
POTASSIUM SERPL-SCNC: 5.2 MMOL/L
PROT SERPL-MCNC: 7.4 G/DL
PROTEIN URINE: NEGATIVE
RBC # BLD: 4.48 M/UL
RBC # BLD: 4.48 M/UL
RBC # FLD: 13 %
RETICS # AUTO: 1.8 %
RETICS AGGREG/RBC NFR: 80.6 K/UL
SARS-COV-2 AB SERPL IA-ACNC: 13.8 U/ML
SARS-COV-2 AB SERPL QL IA: 19.8 INDEX
SODIUM SERPL-SCNC: 137 MMOL/L
SPECIFIC GRAVITY URINE: 1.02
TIBC SERPL-MCNC: 331 UG/DL
TRANSFERRIN SERPL-MCNC: 256 MG/DL
TRIGL SERPL-MCNC: 30 MG/DL
TSH SERPL-ACNC: 1.11 UIU/ML
UIBC SERPL-MCNC: 235 UG/DL
UROBILINOGEN URINE: NORMAL
VIT B12 SERPL-MCNC: 338 PG/ML
WBC # FLD AUTO: 7.64 K/UL

## 2022-01-27 ENCOUNTER — NON-APPOINTMENT (OUTPATIENT)
Age: 27
End: 2022-01-27

## 2022-01-27 ENCOUNTER — APPOINTMENT (OUTPATIENT)
Dept: CARDIOLOGY | Facility: CLINIC | Age: 27
End: 2022-01-27
Payer: COMMERCIAL

## 2022-01-27 VITALS
WEIGHT: 143 LBS | SYSTOLIC BLOOD PRESSURE: 108 MMHG | DIASTOLIC BLOOD PRESSURE: 80 MMHG | BODY MASS INDEX: 22.44 KG/M2 | HEART RATE: 62 BPM | OXYGEN SATURATION: 98 % | HEIGHT: 67 IN

## 2022-01-27 PROCEDURE — 99203 OFFICE O/P NEW LOW 30 MIN: CPT

## 2022-01-27 PROCEDURE — 93225 XTRNL ECG REC<48 HRS REC: CPT

## 2022-01-27 PROCEDURE — 93010 ELECTROCARDIOGRAM REPORT: CPT

## 2022-01-27 NOTE — HISTORY OF PRESENT ILLNESS
[FreeTextEntry1] : 26-year-old female referred for evaluation of complaints of chest heaviness and irregular heartbeat.\par \brigette Patient generally healthy, denies any significant medical issues.  Over the past several months she has noticed episodes where she feels heaviness in the left side of her chest at which time she cannot believe and then a skipped beat resolves it.  She states that the episodes are unrelated to effort or position or food.  They occur on average once every 1 to 2 weeks.  She otherwise has normal activities of daily living.  She denies any dyspnea, diaphoresis or syncopal episodes.\par \par Past medical history noted for exposure to Covid virus several weeks ago but all symptoms of lapsed.  She has no history of significant childhood illnesses.  She does not exercise regularly.  She did have a history of heavy drinking until 1 year ago.  She is also an ex-smoker who quit 1 year ago.  Denies any use of drugs.\brigette emanuel Works as an [employer] .

## 2022-01-27 NOTE — DISCUSSION/SUMMARY
[Patient] : the patient [With PCP] : with ~his/her~ primary care provider [FreeTextEntry1] : 26-year-old female with atypical chest discomfort and possible dysrhythmia.  We will place 24-hour monitor and attempt to capture 1 of these episodes.  If this is insufficient may require long-term monitoring or ILR placement.  Patient will be scheduled for regular stress test to rule out ischemic heart disease.  Given history of EtOH use, would like to have an echocardiogram to assess left ventricular function.  If all the above testing revealed no evidence of any significant cardiac disease, would continue to monitor for symptoms.\par \par All issues discussed with patient and all questions answered.  Time spent equals 35 minutes.

## 2022-02-11 ENCOUNTER — APPOINTMENT (OUTPATIENT)
Dept: CARDIOLOGY | Facility: CLINIC | Age: 27
End: 2022-02-11
Payer: COMMERCIAL

## 2022-02-11 PROCEDURE — 93306 TTE W/DOPPLER COMPLETE: CPT

## 2022-02-22 ENCOUNTER — APPOINTMENT (OUTPATIENT)
Dept: CARDIOLOGY | Facility: CLINIC | Age: 27
End: 2022-02-22
Payer: COMMERCIAL

## 2022-02-22 PROCEDURE — 93015 CV STRESS TEST SUPVJ I&R: CPT

## 2022-05-12 ENCOUNTER — APPOINTMENT (OUTPATIENT)
Dept: OBGYN | Facility: CLINIC | Age: 27
End: 2022-05-12
Payer: COMMERCIAL

## 2022-05-12 VITALS
DIASTOLIC BLOOD PRESSURE: 85 MMHG | BODY MASS INDEX: 22.76 KG/M2 | HEART RATE: 72 BPM | WEIGHT: 145 LBS | HEIGHT: 67 IN | SYSTOLIC BLOOD PRESSURE: 135 MMHG

## 2022-05-12 DIAGNOSIS — Z01.419 ENCOUNTER FOR GYNECOLOGICAL EXAMINATION (GENERAL) (ROUTINE) W/OUT ABNORMAL FINDINGS: ICD-10-CM

## 2022-05-12 DIAGNOSIS — N94.2 VAGINISMUS: ICD-10-CM

## 2022-05-12 PROCEDURE — 99395 PREV VISIT EST AGE 18-39: CPT

## 2022-05-12 PROCEDURE — 99213 OFFICE O/P EST LOW 20 MIN: CPT | Mod: 25

## 2022-05-12 NOTE — PHYSICAL EXAM
[Appropriately responsive] : appropriately responsive [Alert] : alert [No Acute Distress] : no acute distress [Soft] : soft [Non-tender] : non-tender [Non-distended] : non-distended [No HSM] : No HSM [No Lesions] : no lesions [No Mass] : no mass [Oriented x3] : oriented x3 [Examination Of The Breasts] : a normal appearance [No Masses] : no breast masses were palpable [Labia Majora] : normal [Labia Minora] : normal [Normal] : normal [Uterine Adnexae] : normal [Chaperone Present] : A chaperone was present in the examining room during all aspects of the physical examination [Moderate] : There was moderate vaginal bleeding [FreeTextEntry4] : vaginismus

## 2022-05-14 ENCOUNTER — TRANSCRIPTION ENCOUNTER (OUTPATIENT)
Age: 27
End: 2022-05-14

## 2022-05-14 LAB — BACTERIA UR CULT: NORMAL

## 2022-05-19 ENCOUNTER — APPOINTMENT (OUTPATIENT)
Dept: OBGYN | Facility: CLINIC | Age: 27
End: 2022-05-19
Payer: COMMERCIAL

## 2022-05-19 ENCOUNTER — ASOB RESULT (OUTPATIENT)
Age: 27
End: 2022-05-19

## 2022-05-19 ENCOUNTER — TRANSCRIPTION ENCOUNTER (OUTPATIENT)
Age: 27
End: 2022-05-19

## 2022-05-19 PROCEDURE — 76830 TRANSVAGINAL US NON-OB: CPT

## 2022-05-23 ENCOUNTER — APPOINTMENT (OUTPATIENT)
Dept: ORTHOPEDIC SURGERY | Facility: CLINIC | Age: 27
End: 2022-05-23
Payer: COMMERCIAL

## 2022-05-23 VITALS — BODY MASS INDEX: 22.76 KG/M2 | WEIGHT: 145 LBS | HEIGHT: 67 IN

## 2022-05-23 DIAGNOSIS — S43.001A UNSPECIFIED SUBLUXATION OF RIGHT SHOULDER JOINT, INITIAL ENCOUNTER: ICD-10-CM

## 2022-05-23 DIAGNOSIS — M25.311 OTHER INSTABILITY, RIGHT SHOULDER: ICD-10-CM

## 2022-05-23 PROCEDURE — 73010 X-RAY EXAM OF SHOULDER BLADE: CPT | Mod: RT

## 2022-05-23 PROCEDURE — 73030 X-RAY EXAM OF SHOULDER: CPT | Mod: RT

## 2022-05-23 PROCEDURE — 99204 OFFICE O/P NEW MOD 45 MIN: CPT

## 2022-05-23 NOTE — IMAGING
[de-identified] : \par RIGHT SHOULDER\par Inspection: No swelling. \par Palpation: Tenderness is noted at the anterior shoulder. \par Range of motion:  Full range of motion but with some pain.\par Strength: There is pain and discomfort with strength testing. \par Neurological testing: motor and sensor intact distally.\par Ligament Stability and Special Tests: \par Shoulder apprehension: pos\par Shoulder relocation: pos\par Vargas’s test: pos\par Biceps Active test: pos\par Marcano Labral Shear: pos\par Impingement testing: neg\par Yazmin testing: pain\par Whipple: neg\par Cross Body Adduction: neg\par \par

## 2022-05-23 NOTE — HISTORY OF PRESENT ILLNESS
[de-identified] : 27 year old female  (RHD, [employer] , works out)   right shoulder pain since injury 5/21/22 felt shoulder pop.  H/o shoulder dislocation 2018 tx nonop.\par The pain is located  anterior and deep\par The pain is associated with popping and clicking \par Worse with activity and better at rest.\par Has tried ice, nsaids, ibuprofen, PT, bio freeze \par

## 2022-05-23 NOTE — ASSESSMENT
[FreeTextEntry1] : Right X-Ray Examination of the SHOULDER 2 views:  no fractures, subluxations or dislocations. \par X-Ray Examination of the SCAPULA 1 or 2 views shows: no significant abnormalities\par \par Due to the patients instability event along with exam consistent with labral tear we will get an mri to eval integrity of labrum\par \par - The patient was advised to apply ice (wrapped in a towel or protective covering) to the area daily (20 minutes at a time, 2-4X/day).\par - The patient was advised to modify their activities.\par - f/u after mri\par \par \par \par \par \par

## 2022-05-31 ENCOUNTER — NON-APPOINTMENT (OUTPATIENT)
Age: 27
End: 2022-05-31

## 2022-06-03 ENCOUNTER — NON-APPOINTMENT (OUTPATIENT)
Age: 27
End: 2022-06-03

## 2022-06-15 ENCOUNTER — APPOINTMENT (OUTPATIENT)
Dept: MRI IMAGING | Facility: CLINIC | Age: 27
End: 2022-06-15

## 2022-06-19 ENCOUNTER — NON-APPOINTMENT (OUTPATIENT)
Age: 27
End: 2022-06-19

## 2022-08-08 ENCOUNTER — APPOINTMENT (OUTPATIENT)
Dept: ORTHOPEDIC SURGERY | Facility: CLINIC | Age: 27
End: 2022-08-08

## 2022-08-08 VITALS — HEIGHT: 67 IN | BODY MASS INDEX: 22.76 KG/M2 | WEIGHT: 145 LBS

## 2022-08-08 DIAGNOSIS — S93.491A SPRAIN OF OTHER LIGAMENT OF RIGHT ANKLE, INITIAL ENCOUNTER: ICD-10-CM

## 2022-08-08 PROCEDURE — 73610 X-RAY EXAM OF ANKLE: CPT | Mod: RT

## 2022-08-08 PROCEDURE — 99214 OFFICE O/P EST MOD 30 MIN: CPT

## 2022-08-08 PROCEDURE — L4350: CPT

## 2022-08-08 PROCEDURE — 99072 ADDL SUPL MATRL&STAF TM PHE: CPT

## 2022-08-08 NOTE — HISTORY OF PRESENT ILLNESS
[Work related] : work related [6] : 6 [5] : 5 [Sharp] : sharp [Not working due to injury] : Work status: not working due to injury [de-identified] : 08/08/2022: Pt missed a step at work and fell today. Walking in regular shoes. no prior ankle probs. no tx to date. no dm/tob. [employer] delivery [] : Post Surgical Visit: no [FreeTextEntry1] : RT ankle  [FreeTextEntry3] : 8/8/22 [FreeTextEntry6] : itchy  [de-identified] : [employer]

## 2022-08-08 NOTE — IMAGING
[Right] : right ankle [Weight -] : weightbearing [There are no fractures, subluxations or dislocations. No significant abnormalities are seen] : There are no fractures, subluxations or dislocations. No significant abnormalities are seen

## 2022-08-08 NOTE — PHYSICAL EXAM
[Right] : right foot and ankle [5___] : [facility]unfilled[facility]5 [2+] : dorsalis pedis pulse: 2+ [] : patient ambulates without assistive device [de-identified] : plantar flexion 30 degrees [TWNoteComboBox7] : dorsiflexion 10 degrees

## 2022-08-08 NOTE — HISTORY OF PRESENT ILLNESS
[Work related] : work related [6] : 6 [5] : 5 [Sharp] : sharp [Not working due to injury] : Work status: not working due to injury [de-identified] : 08/08/2022: Pt missed a step at work and fell today. Walking in regular shoes. no prior ankle probs. no tx to date. no dm/tob. [employer] delivery [] : Post Surgical Visit: no [FreeTextEntry1] : RT ankle  [FreeTextEntry3] : 8/8/22 [FreeTextEntry6] : itchy  [de-identified] : [employer]

## 2022-08-08 NOTE — PHYSICAL EXAM
[Right] : right foot and ankle [5___] : [facility] 5[unfilled]/5 [2+] : dorsalis pedis pulse: 2+ [] : patient ambulates without assistive device [de-identified] : plantar flexion 30 degrees [TWNoteComboBox7] : dorsiflexion 10 degrees

## 2022-08-15 ENCOUNTER — APPOINTMENT (OUTPATIENT)
Dept: ORTHOPEDIC SURGERY | Facility: CLINIC | Age: 27
End: 2022-08-15

## 2022-08-15 VITALS — HEIGHT: 67 IN | WEIGHT: 140 LBS | BODY MASS INDEX: 21.97 KG/M2

## 2022-08-15 PROCEDURE — 99072 ADDL SUPL MATRL&STAF TM PHE: CPT

## 2022-08-15 PROCEDURE — 99214 OFFICE O/P EST MOD 30 MIN: CPT

## 2022-08-15 NOTE — PHYSICAL EXAM
[Right] : right foot and ankle [5___] : [facility] 5[unfilled]/5 [2+] : dorsalis pedis pulse: 2+ [] : no achilles tendon tenderness [de-identified] : plantar flexion 30 degrees [TWNoteComboBox7] : dorsiflexion 10 degrees

## 2022-08-15 NOTE — ASSESSMENT
[FreeTextEntry1] : wbat\par airsport\par ice/elevate\par nsaids prn\par PT\par out from work\par f/up 4 wks

## 2022-08-15 NOTE — HISTORY OF PRESENT ILLNESS
[Work related] : work related [5] : 5 [7] : 7 [Sharp] : sharp [Ice] : ice [Not working due to injury] : Work status: not working due to injury [de-identified] : 08/08/2022: Pt missed a step at work and fell today. Walking in regular shoes. no prior ankle probs. no tx to date. no dm/tob. [employer] delivery\par \par 08/15/2022: some improvement. wearing brace. not working.  [] : Post Surgical Visit: no [FreeTextEntry1] : RT ankle  [FreeTextEntry3] : 8/8/22 [de-identified] : [employer]

## 2022-09-12 ENCOUNTER — APPOINTMENT (OUTPATIENT)
Dept: ORTHOPEDIC SURGERY | Facility: CLINIC | Age: 27
End: 2022-09-12

## 2022-09-12 DIAGNOSIS — S93.491D SPRAIN OF OTHER LIGAMENT OF RIGHT ANKLE, SUBSEQUENT ENCOUNTER: ICD-10-CM

## 2022-09-12 PROCEDURE — 99213 OFFICE O/P EST LOW 20 MIN: CPT

## 2022-09-12 PROCEDURE — 99072 ADDL SUPL MATRL&STAF TM PHE: CPT

## 2022-09-12 NOTE — WORK
[Sprain/Strain] : sprain/strain [Was the competent medical cause of the injury] : was the competent medical cause of the injury [Are consistent with the injury] : are consistent with the injury [Consistent with my objective findings] : consistent with my objective findings [Does not reveal pre-existing condition(s) that may affect treatment/prognosis] : does not reveal pre-existing condition(s) that may affect treatment/prognosis [Cannot return to work because ________] : cannot return to work because [unfilled] [N/A] : : Not Applicable [Patient] : patient [No Rx restrictions] : No Rx restrictions. [I provided the services listed above] :  I provided the services listed above. [Mild Partial] : mild partial [FreeTextEntry1] : good

## 2022-09-12 NOTE — ASSESSMENT
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

## 2022-09-12 NOTE — HISTORY OF PRESENT ILLNESS
[Work related] : work related [2] : 2 [0] : 0 [Dull/Aching] : dull/aching [Ice] : ice [Not working due to injury] : Work status: not working due to injury [de-identified] : 08/08/2022: Pt missed a step at work and fell today. Walking in regular shoes. no prior ankle probs. no tx to date. no dm/tob. [employer] delivery\par \par 08/15/2022: some improvement. wearing brace. not working. \par \par 09/12/2022 pain improving. walking in regular shoes. out of work. 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

## 2022-09-12 NOTE — PHYSICAL EXAM
[Right] : right foot and ankle [2+] : dorsalis pedis pulse: 2+ [NL (40)] : plantar flexion 40 degrees [NL 30)] : inversion 30 degrees [NL (20)] : eversion 20 degrees [5___] : eversion 5[unfilled]/5 [] : non-antalgic [FreeTextEntry8] : improved [de-identified] : plantar flexion 30 degrees [TWNoteComboBox7] : dorsiflexion 15 degrees

## 2022-11-14 ENCOUNTER — APPOINTMENT (OUTPATIENT)
Dept: ORTHOPEDIC SURGERY | Facility: CLINIC | Age: 27
End: 2022-11-14

## 2022-11-22 ENCOUNTER — APPOINTMENT (OUTPATIENT)
Dept: FAMILY MEDICINE | Facility: CLINIC | Age: 27
End: 2022-11-22

## 2022-11-22 VITALS
HEIGHT: 67 IN | RESPIRATION RATE: 16 BRPM | HEART RATE: 100 BPM | SYSTOLIC BLOOD PRESSURE: 102 MMHG | BODY MASS INDEX: 20.09 KG/M2 | OXYGEN SATURATION: 99 % | TEMPERATURE: 98 F | WEIGHT: 128 LBS | DIASTOLIC BLOOD PRESSURE: 62 MMHG

## 2022-11-22 DIAGNOSIS — R53.83 OTHER FATIGUE: ICD-10-CM

## 2022-11-22 DIAGNOSIS — R63.4 ABNORMAL WEIGHT LOSS: ICD-10-CM

## 2022-11-22 PROCEDURE — 99215 OFFICE O/P EST HI 40 MIN: CPT | Mod: 25

## 2022-11-22 RX ORDER — METHYLPREDNISOLONE 4 MG/1
4 TABLET ORAL
Qty: 1 | Refills: 0 | Status: COMPLETED | COMMUNITY
Start: 2022-05-23 | End: 2022-11-22

## 2022-11-23 PROBLEM — R63.4 WEIGHT LOSS: Status: ACTIVE | Noted: 2022-11-23

## 2022-11-23 PROBLEM — R53.83 FATIGUE: Status: ACTIVE | Noted: 2022-11-22

## 2022-11-23 NOTE — HISTORY OF PRESENT ILLNESS
[FreeTextEntry8] : cc: weight loss, fatigue\par Pt came to check on her health, she is not eating that much, deneis fever, chills, on and off feels tired , she is not as active as she used to be so she eats less and she lost.Pt deneis chills, no pain, no cough.pT DENEIS cp,sob, NO  abd pain. Pt has had COVID in the past.

## 2022-11-23 NOTE — HEALTH RISK ASSESSMENT
[Never] : Never [Yes] : Yes [Monthly or less (1 pt)] : Monthly or less (1 point) [1 or 2 (0 pts)] : 1 or 2 (0 points) [Never (0 pts)] : Never (0 points) [No] : In the past 12 months have you used drugs other than those required for medical reasons? No [No falls in past year] : Patient reported no falls in the past year [0] : 2) Feeling down, depressed, or hopeless: Not at all (0) [PHQ-2 Negative - No further assessment needed] : PHQ-2 Negative - No further assessment needed [de-identified] : walking , but less active [de-identified] : regular [[id number]] : 0

## 2022-11-29 LAB
25(OH)D3 SERPL-MCNC: 41.8 NG/ML
ALBUMIN SERPL ELPH-MCNC: 4.7 G/DL
ALP BLD-CCNC: 47 U/L
ALT SERPL-CCNC: 5 U/L
ANA SER IF-ACNC: NEGATIVE
ANION GAP SERPL CALC-SCNC: 16 MMOL/L
APPEARANCE: CLEAR
AST SERPL-CCNC: 12 U/L
BASOPHILS # BLD AUTO: 0.07 K/UL
BASOPHILS NFR BLD AUTO: 1 %
BILIRUB SERPL-MCNC: 0.2 MG/DL
BILIRUBIN URINE: NEGATIVE
BLOOD URINE: NEGATIVE
BUN SERPL-MCNC: 15 MG/DL
CALCIUM SERPL-MCNC: 10 MG/DL
CHLORIDE SERPL-SCNC: 106 MMOL/L
CMV IGM SERPL QL: <8 AU/ML
CMV IGM SERPL QL: NEGATIVE
CO2 SERPL-SCNC: 21 MMOL/L
COLOR: COLORLESS
CREAT SERPL-MCNC: 0.76 MG/DL
CRP SERPL-MCNC: <3 MG/L
EBV EA AB SER IA-ACNC: <5 U/ML
EBV EA AB TITR SER IF: POSITIVE
EBV EA IGG SER QL IA: 261 U/ML
EBV EA IGG SER-ACNC: NEGATIVE
EBV EA IGM SER IA-ACNC: NEGATIVE
EBV PATRN SPEC IB-IMP: NORMAL
EBV VCA IGG SER IA-ACNC: 29.4 U/ML
EBV VCA IGM SER QL IA: 19.7 U/ML
EGFR: 111 ML/MIN/1.73M2
ENA SS-A AB SER IA-ACNC: <0.2 AL
ENA SS-B AB SER IA-ACNC: <0.2 AL
EOSINOPHIL # BLD AUTO: 0.09 K/UL
EOSINOPHIL NFR BLD AUTO: 1.3 %
EPSTEIN-BARR VIRUS CAPSID ANTIGEN IGG: POSITIVE
ERYTHROCYTE [SEDIMENTATION RATE] IN BLOOD BY WESTERGREN METHOD: 11 MM/HR
GLUCOSE QUALITATIVE U: NEGATIVE
GLUCOSE SERPL-MCNC: 82 MG/DL
HCT VFR BLD CALC: 39 %
HGB BLD-MCNC: 12.6 G/DL
IMM GRANULOCYTES NFR BLD AUTO: 0.3 %
IRON SERPL-MCNC: 88 UG/DL
KETONES URINE: NEGATIVE
LEUKOCYTE ESTERASE URINE: NEGATIVE
LYMPHOCYTES # BLD AUTO: 2.11 K/UL
LYMPHOCYTES NFR BLD AUTO: 29.9 %
M TB IFN-G BLD-IMP: NEGATIVE
MAN DIFF?: NORMAL
MCHC RBC-ENTMCNC: 28.8 PG
MCHC RBC-ENTMCNC: 32.3 GM/DL
MCV RBC AUTO: 89.2 FL
MONOCYTES # BLD AUTO: 0.53 K/UL
MONOCYTES NFR BLD AUTO: 7.5 %
NEUTROPHILS # BLD AUTO: 4.23 K/UL
NEUTROPHILS NFR BLD AUTO: 60 %
NITRITE URINE: NEGATIVE
PH URINE: 7
PLATELET # BLD AUTO: 325 K/UL
POTASSIUM SERPL-SCNC: 4 MMOL/L
PREALB SERPL NEPH-MCNC: 25 MG/DL
PROT SERPL-MCNC: 7.4 G/DL
PROTEIN URINE: NEGATIVE
QUANTIFERON TB PLUS MITOGEN MINUS NIL: >10 IU/ML
QUANTIFERON TB PLUS NIL: 0.02 IU/ML
QUANTIFERON TB PLUS TB1 MINUS NIL: 0 IU/ML
QUANTIFERON TB PLUS TB2 MINUS NIL: 0 IU/ML
RBC # BLD: 4.37 M/UL
RBC # FLD: 12.5 %
SODIUM SERPL-SCNC: 143 MMOL/L
SPECIFIC GRAVITY URINE: 1.01
T4 FREE SERPL-MCNC: 1.2 NG/DL
T4 SERPL-MCNC: 6.9 UG/DL
TSH SERPL-ACNC: 1.34 UIU/ML
UROBILINOGEN URINE: NORMAL
VIT B12 SERPL-MCNC: 361 PG/ML
WBC # FLD AUTO: 7.05 K/UL

## 2023-02-14 NOTE — COUNSELING
Health Maintenance Due   Topic Date Due   • Hepatitis B Vaccine (1 of 3 - 3-dose series) Never done   • IPV Vaccine (1 of 4 - 4-dose series) Never done   • HIB Vaccine (1 of 4 - Standard series) Never done   • DTaP/Tdap/Td Vaccine (1 - DTaP) Never done   • Pneumococcal Vaccine 0-64 (1 - PCV13 or PCV15) Never done   • Influenza Vaccine (1 of 2) Never done   • Well Child Exam 6 Months  Never done   • COVID-19 Vaccine (1) Never done       Patient is due for topics listed above, she wishes to proceed with Immunization(s) Influenza, but is not proceeding with Immunization(s) COVID-19, Dtap/Tdap/Td, Hep B, HIB, IPV, Pneumococcal and Rotavirus and Well Child Exam at this time. The following has occurred: Education provided for Immunization(s) COVID-19, Dtap/Tdap/Td, Hep B, HIB, Influenza, IPV, Pneumococcal and Rotavirus. Outside records requested for Immunization(s) COVID-19, Dtap/Tdap/Td, Hep B, HIB, Influenza, IPV, Pneumococcal and Rotavirus and Well Child Exam.    Family recently moved here from [place]. Per Dad, pt already had 6 mo WCE and 1st flu shot prior to leaving and is due for her second flu shot. (Dad states 1st was given early Jan 2023). He believes pt is UTD on age vaccines. Advised to sign RASTA form on the way out so records could be received and put into system. Dad verbalized understanding.    [Good understanding] : Patient has a good understanding of lifestyle changes and steps needed to achieve self management goal

## 2023-09-02 ENCOUNTER — NON-APPOINTMENT (OUTPATIENT)
Age: 28
End: 2023-09-02

## 2024-05-13 ENCOUNTER — LABORATORY RESULT (OUTPATIENT)
Age: 29
End: 2024-05-13

## 2024-05-13 ENCOUNTER — APPOINTMENT (OUTPATIENT)
Dept: FAMILY MEDICINE | Facility: CLINIC | Age: 29
End: 2024-05-13
Payer: MEDICAID

## 2024-05-13 ENCOUNTER — NON-APPOINTMENT (OUTPATIENT)
Age: 29
End: 2024-05-13

## 2024-05-13 VITALS
DIASTOLIC BLOOD PRESSURE: 66 MMHG | SYSTOLIC BLOOD PRESSURE: 108 MMHG | HEART RATE: 89 BPM | OXYGEN SATURATION: 100 % | RESPIRATION RATE: 15 BRPM | WEIGHT: 132 LBS | TEMPERATURE: 97.6 F | HEIGHT: 67 IN | BODY MASS INDEX: 20.72 KG/M2

## 2024-05-13 DIAGNOSIS — Z87.898 PERSONAL HISTORY OF OTHER SPECIFIED CONDITIONS: ICD-10-CM

## 2024-05-13 DIAGNOSIS — R79.89 OTHER SPECIFIED ABNORMAL FINDINGS OF BLOOD CHEMISTRY: ICD-10-CM

## 2024-05-13 DIAGNOSIS — Z86.2 PERSONAL HISTORY OF DISEASES OF THE BLOOD AND BLOOD-FORMING ORGANS AND CERTAIN DISORDERS INVOLVING THE IMMUNE MECHANISM: ICD-10-CM

## 2024-05-13 DIAGNOSIS — R10.31 RIGHT LOWER QUADRANT PAIN: ICD-10-CM

## 2024-05-13 DIAGNOSIS — R09.89 OTHER SPECIFIED SYMPTOMS AND SIGNS INVOLVING THE CIRCULATORY AND RESPIRATORY SYSTEMS: ICD-10-CM

## 2024-05-13 DIAGNOSIS — Z86.19 PERSONAL HISTORY OF OTHER INFECTIOUS AND PARASITIC DISEASES: ICD-10-CM

## 2024-05-13 DIAGNOSIS — Z00.00 ENCOUNTER FOR GENERAL ADULT MEDICAL EXAMINATION W/OUT ABNORMAL FINDINGS: ICD-10-CM

## 2024-05-13 DIAGNOSIS — R07.89 OTHER CHEST PAIN: ICD-10-CM

## 2024-05-13 DIAGNOSIS — M79.18 MYALGIA, OTHER SITE: ICD-10-CM

## 2024-05-13 DIAGNOSIS — R59.0 LOCALIZED ENLARGED LYMPH NODES: ICD-10-CM

## 2024-05-13 PROCEDURE — 99395 PREV VISIT EST AGE 18-39: CPT

## 2024-05-13 PROCEDURE — 93000 ELECTROCARDIOGRAM COMPLETE: CPT

## 2024-05-13 PROCEDURE — 36415 COLL VENOUS BLD VENIPUNCTURE: CPT

## 2024-05-13 NOTE — DATA REVIEWED
[FreeTextEntry1] : last blood work d/w the pt at length  EKG - Sinus agusto 56  bpm, no acute changes

## 2024-05-13 NOTE — PHYSICAL EXAM
[No JVD] : no jugular venous distention [No Lymphadenopathy] : no lymphadenopathy [Supple] : supple [No Varicosities] : no varicosities [No Edema] : there was no peripheral edema [Alert and Oriented x3] : oriented to person, place, and time [Normal] : affect was normal and insight and judgment were intact [Normal Oropharynx] : the oropharynx was normal [de-identified] : + edematosus nasal turbines , + deviated septum

## 2024-05-13 NOTE — HEALTH RISK ASSESSMENT
[Very Good] : ~his/her~  mood as very good [1 or 2 (0 pts)] : 1 or 2 (0 points) [Never (0 pts)] : Never (0 points) [No] : In the past 12 months have you used drugs other than those required for medical reasons? No [No falls in past year] : Patient reported no falls in the past year [0] : 2) Feeling down, depressed, or hopeless: Not at all (0) [PHQ-2 Negative - No further assessment needed] : PHQ-2 Negative - No further assessment needed [Patient reported PAP Smear was normal] : Patient reported PAP Smear was normal [None] : None [With Family] : lives with family [High School] : high school [Single] : single [# Of Children ___] : has [unfilled] children [Sexually Active] : sexually active [Feels Safe at Home] : Feels safe at home [Fully functional (bathing, dressing, toileting, transferring, walking, feeding)] : Fully functional (bathing, dressing, toileting, transferring, walking, feeding) [Fully functional (using the telephone, shopping, preparing meals, housekeeping, doing laundry, using] : Fully functional and needs no help or supervision to perform IADLs (using the telephone, shopping, preparing meals, housekeeping, doing laundry, using transportation, managing medications and managing finances) [Smoke Detector] : smoke detector [Carbon Monoxide Detector] : carbon monoxide detector [Seat Belt] :  uses seat belt [Sunscreen] : uses sunscreen [With Patient/Caregiver] : , with patient/caregiver [Aggressive treatment] : aggressive treatment [Former] : Former [FreeTextEntry1] : none [de-identified] : [facility]  [de-identified] : pt used to drink a lot of alcohol , not any more quits 2 years ago  [Audit-CScore] : 1 [de-identified] : walking [de-identified] : regular  [[id number]] : 0 [Change in mental status noted] : No change in mental status noted [Language] : denies difficulty with language [Reports changes in hearing] : Reports no changes in hearing [Reports changes in vision] : Reports no changes in vision [MammogramDate] : NA [PapSmearDate] : 12/20 [BoneDensityDate] : NA [ColonoscopyDate] : NA [HIVDate] : 12/20 [HepatitisCDate] : 12/20 [AdvancecareDate] : 05/24 [de-identified] : used to vape

## 2024-05-13 NOTE — REVIEW OF SYSTEMS
[Fatigue] : fatigue [Negative] : Heme/Lymph [Fever] : no fever [Chills] : no chills [Night Sweats] : no night sweats [Earache] : no earache [Hearing Loss] : no hearing loss [Nosebleed] : no nosebleeds [Nasal Discharge] : nasal discharge [Sore Throat] : no sore throat [Postnasal Drip] : postnasal drip [Swollen Glands] : no swollen glands

## 2024-05-13 NOTE — HISTORY OF PRESENT ILLNESS
[FreeTextEntry1] : cc; wellness visit , runny nose ALL the time since COVID 2 years ago  [de-identified] : Patient  presented for her wellness visit.  Pt denies CP,SPOB,abd pain, no N, no V,no C. Pt c/o runnu nose ALL the time since COVID 2 years - ago, she did not take any meds OTC

## 2024-05-15 LAB
ALBUMIN SERPL ELPH-MCNC: 4.5 G/DL
ALP BLD-CCNC: 38 U/L
ALT SERPL-CCNC: 11 U/L
ANION GAP SERPL CALC-SCNC: 14 MMOL/L
APPEARANCE: CLEAR
AST SERPL-CCNC: 16 U/L
BACTERIA: NEGATIVE /HPF
BASOPHILS # BLD AUTO: 0.06 K/UL
BASOPHILS NFR BLD AUTO: 0.9 %
BILIRUB SERPL-MCNC: 0.3 MG/DL
BILIRUBIN URINE: NEGATIVE
BLOOD URINE: NEGATIVE
BOXELDER IGE QN: <0.1 KUA/L
BUN SERPL-MCNC: 16 MG/DL
CALCIUM SERPL-MCNC: 9.3 MG/DL
CAST: 0 /LPF
CEDAR IGE QN: <0.1 KUA/L
CHLORIDE SERPL-SCNC: 102 MMOL/L
CHOLEST SERPL-MCNC: 156 MG/DL
CO2 SERPL-SCNC: 21 MMOL/L
COCKSFOOT IGE QN: <0.1 KUA/L
COLOR: YELLOW
COMMON RAGWEED IGE QN: <0.1 KUA/L
COMMON WASP (YELLOW JACKET) CLASS: 0 (ref 0–?)
COMMON WASP (YELLOW JACKET) CONC: <0.1 KUA/L
CREAT SERPL-MCNC: 1.01 MG/DL
CREAT SPEC-SCNC: 80 MG/DL
D FARINAE IGE QN: <0.1 KUA/L
D PTERONYSS IGE QN: <0.1 KUA/L
DEPRECATED BOXELDER IGE RAST QL: 0 (ref 0–?)
DEPRECATED CEDAR IGE RAST QL: 0
DEPRECATED COCKSFOOT IGE RAST QL: 0
DEPRECATED COMMON RAGWEED IGE RAST QL: 0 (ref 0–?)
DEPRECATED D FARINAE IGE RAST QL: 0 (ref 0–?)
DEPRECATED D PTERONYSS IGE RAST QL: 0 (ref 0–?)
DEPRECATED GIANT RAGWEED IGE RAST QL: 0
DEPRECATED HOUSE DUST IGE RAST QL: <0.1 KUA/L
DEPRECATED KENT BLUE GRASS IGE RAST QL: 0
DEPRECATED RED TOP GRASS IGE RAST QL: 0
DEPRECATED ROACH IGE RAST QL: 0 (ref 0–?)
DEPRECATED SILVER BIRCH IGE RAST QL: 0 (ref 0–?)
DEPRECATED TIMOTHY IGE RAST QL: 0 (ref 0–?)
DEPRECATED WHITE HICKORY IGE RAST QL: 0
DEPRECATED WHITE OAK IGE RAST QL: 0 (ref 0–?)
DEPRECATED WHITEFACED HORNET IGE RAST QL: 0 (ref 0–?)
EGFR: 78 ML/MIN/1.73M2
EOSINOPHIL # BLD AUTO: 0.11 K/UL
EOSINOPHIL NFR BLD AUTO: 1.7 %
EPITHELIAL CELLS: 1 /HPF
GIANT RAGWEED IGE QN: <0.1 KUA/L
GLUCOSE QUALITATIVE U: NEGATIVE MG/DL
GLUCOSE SERPL-MCNC: 90 MG/DL
HCT VFR BLD CALC: 37.6 %
HDLC SERPL-MCNC: 74 MG/DL
HGB BLD-MCNC: 12.6 G/DL
HONEY BEE (I1) CLASS: 0 (ref 0–?)
HONEY BEE (I1) CONC: <0.1 KUA/L
HOUSE DUST AP IGE QN: 0
IMM GRANULOCYTES NFR BLD AUTO: 0.2 %
KENT BLUE GRASS IGE QN: <0.1 KUA/L
KETONES URINE: NEGATIVE MG/DL
LDLC SERPL CALC-MCNC: 73 MG/DL
LEUKOCYTE ESTERASE URINE: NEGATIVE
LYMPHOCYTES # BLD AUTO: 2.2 K/UL
LYMPHOCYTES NFR BLD AUTO: 33.3 %
MAN DIFF?: NORMAL
MCHC RBC-ENTMCNC: 30.4 PG
MCHC RBC-ENTMCNC: 33.5 GM/DL
MCV RBC AUTO: 90.6 FL
MICROALBUMIN 24H UR DL<=1MG/L-MCNC: 3.3 MG/DL
MICROALBUMIN/CREAT 24H UR-RTO: 41 MG/G
MICROSCOPIC-UA: NORMAL
MONOCYTES # BLD AUTO: 0.51 K/UL
MONOCYTES NFR BLD AUTO: 7.7 %
NEUTROPHILS # BLD AUTO: 3.72 K/UL
NEUTROPHILS NFR BLD AUTO: 56.2 %
NITRITE URINE: NEGATIVE
NONHDLC SERPL-MCNC: 82 MG/DL
PAPER WASP (I4) CLASS: 0 (ref 0–?)
PAPER WASP (I4) CONC: <0.1 KUA/L
PH URINE: 6.5
PLATELET # BLD AUTO: 239 K/UL
POTASSIUM SERPL-SCNC: 4.2 MMOL/L
PROT SERPL-MCNC: 6.9 G/DL
PROTEIN URINE: NEGATIVE MG/DL
RBC # BLD: 4.15 M/UL
RBC # FLD: 12.7 %
RED BLOOD CELLS URINE: 0 /HPF
RED TOP GRASS IGE QN: <0.1 KUA/L
ROACH IGE QN: <0.1 KUA/L
SILVER BIRCH IGE QN: <0.1 KUA/L
SODIUM SERPL-SCNC: 137 MMOL/L
SPECIFIC GRAVITY URINE: 1.02
TIMOTHY IGE QN: <0.1 KUA/L
TRIGL SERPL-MCNC: 36 MG/DL
TSH SERPL-ACNC: 1.73 UIU/ML
UROBILINOGEN URINE: 0.2 MG/DL
WBC # FLD AUTO: 6.61 K/UL
WHITE BLOOD CELLS URINE: 0 /HPF
WHITE HICKORY IGE QN: <0.1 KUA/L
WHITE OAK IGE QN: <0.1 KUA/L
WHITEFACED HORNET IGE QN: <0.1 KUA/L
YELLOW HORNET (I5) CLASS: 0 (ref 0–?)
YELLOW HORNET (I5) CONC: <0.1 KUA/L

## 2024-09-07 ENCOUNTER — NON-APPOINTMENT (OUTPATIENT)
Age: 29
End: 2024-09-07

## 2025-01-01 ENCOUNTER — NON-APPOINTMENT (OUTPATIENT)
Age: 30
End: 2025-01-01

## 2025-01-07 ENCOUNTER — NON-APPOINTMENT (OUTPATIENT)
Age: 30
End: 2025-01-07

## 2025-05-15 ENCOUNTER — APPOINTMENT (OUTPATIENT)
Dept: FAMILY MEDICINE | Facility: CLINIC | Age: 30
End: 2025-05-15

## 2025-09-19 ENCOUNTER — NON-APPOINTMENT (OUTPATIENT)
Age: 30
End: 2025-09-19